# Patient Record
Sex: FEMALE | Race: WHITE | Employment: UNEMPLOYED | URBAN - METROPOLITAN AREA
[De-identification: names, ages, dates, MRNs, and addresses within clinical notes are randomized per-mention and may not be internally consistent; named-entity substitution may affect disease eponyms.]

---

## 2021-11-04 ENCOUNTER — HOSPITAL ENCOUNTER (OUTPATIENT)
Dept: PHYSICAL THERAPY | Age: 76
Discharge: HOME | End: 2021-11-04
Payer: COMMERCIAL

## 2021-11-04 PROCEDURE — 97162 PT EVAL MOD COMPLEX 30 MIN: CPT

## 2021-11-04 PROCEDURE — 97535 SELF CARE MNGMENT TRAINING: CPT

## 2021-11-04 PROCEDURE — 97112 NEUROMUSCULAR REEDUCATION: CPT

## 2021-11-04 NOTE — PROGRESS NOTES
Physical Therapy Evaluation        Patient Name: Rip Dobson  Date:2021  : 1945  [x]  Patient  Verified  Payor: BLUE CROSS / Plan: [facility] / Product Type: PPO /    In time:1150  Out time:1230  Total Treatment Time (min): 40  Visit #: 1 of 10    Medicare/BCBS Only   Total Timed Codes (min):  25 1:1 Treatment Time:  40       Treatment Area: Other symptoms and signs involving the genitourinary system [R39.89]    SUBJECTIVE  Pain Level (0-10 scale): 0  []constant []intermittent []improving []worsening []no change since onset    Any medication changes, allergies to medications, adverse drug reactions, diagnosis change, or new procedure performed?: [x] No    [] Yes (see summary sheet for update)  Subjective functional status/changes:     PLOF: no functional limitation, no issues with ADLs, active lifestyle  Limitations to PLOF: Decreased strength, coordination and endurance, poor behavioral and dietary habits for bowel health  Mechanism of Injury: insidious onset about a year ago  Current symptoms/Complaints: Patient c/o vaginal pressure and prolapse complaints. Patient states she thought she had some bleeding hemorrhoids at some point last year; states she had them removed but \"something was still there hanging out\" and states she took a picture and was told \"that's not from the rectum, its from the vagina\" which is when she was referred to Dr Noelle Mehta. Pt states Dr Noelle Mehta referred to PF PT to see if it can be managed conservatively; pt reports she would like to avoid having any type of surgery unless it is life threatening. Pt denies any pain with prolapse, just \"a little oozing\" from time to time and does report occasional \"fishy odor\".    Previous Treatment/Compliance: none, hemorrhoidectomy  PMHx/Surgical Hx: 4 pregnancies/3 vaginal deliveries; MEHREEN , RA, HBP, stroke, inguinal hernia repair; appendectomy  Work Hx: retired  Living Situation: alone  Pt Goals: \"to strengthen my muscles and avoid having surgery\"  Barriers: []pain []financial []time []transportation []other  Motivation: high  Substance use: [x]Alcohol []Tobacco []other:   Cognition: A & O x 4    Other:    Current urinary complaint  None    Daytime urinary frequency:  Every 3-4 hour(s) during the day    Nocturia:  2-3x/ night    Patient has failed previous pelvic floor muscle training? [] Yes    [x] No    Bowel function:  Constipation,  hard stool with straining  Stool Type ([place]): 1-2  Toileting position/modifications: standard toileting posture; has had to press into perineum to help evacuate, no splinting    Fecal Incontinence present? Weekly, stool gets pocketed in rectum and sometimes just finds it in the shower or the pad    Pain complaint:  none; RA related pain    Pain scale:  N/A    Pain description:  NA    Diet: \"I eat a lot of chocolate\" eating 3 meals/day with snacks, a lot of fruit, not into veggies    Fluid intake:    Fluid  Amount per day   Water \"I don't care for water so I will add something to it\" maybe 32-48 oz ice with some flavor (diluting all cokes/drinks with water)   Caffeine Decaf coffee and sodas   Alcohol rarely             Physical Exam Objective Findings:    Pelvic Floor Assessment  Patient was educated on pelvic floor anatomy, structure, and function and implications for current presentation of s/s. Patient consents to pelvic floor assessment.         Postural assessment:  neutral sitting and standing posture- good alignment    Range of Motion:  Patient with significant low back ROM restriction and accommodates with postural and gait changes     External trigger point/ muscle tenderness:    Superficial PFM tenderness/restriction no tenderness/tightness noted      PFM Screen:    Skin Integrity:  [] Healthy [x] Red  [] Labia Atrophy [] Fragile    Sensation: [x] Intact [] Diminished:    Muscle Bulk: [x] Symmetrical  [] Well-developed [x] Atrophied:  []L   []R   []B    Prolapse: [] Cystocele:   [x] Rectocele:visible at introitus upon visual inspection; did not specifically assess severity or level of prolapse today  [] Uterine prolapse:     Ability to actively bulge: min  Bulge with cough mod; bulge mod with knack prior to cough    Pelvic floor manual exam: Performed via internal vaginal assessment  female-upon vaginal palpation of the pelvic floor, the following was noted: mild hypotonicity, mild decreased sensation  Introitus restriction/TTP (reported as hands on a clock): none    Deep PFM Tenderness/Restriction: no tenderness    Pelvic floor MMT    PERF (Performance/Endurance/Repetitions//Flicks): 8/3/7//MARTIN  Breath holding and glut substitution noted; pt was unable to perform isolated PF contraction despite VC/TC and visual aid    Pelvic muscle release pattern  1 - poor release, no sensation of release      25 min [x]Eval                  []Re-Eval       15 min Self Care: Review and handout provided on the following: PFM anatomy, structure and function as it pertains to current s/s, complaints and condition. Reviewed expectations for PFPT and POC. Provided PF HEP *for improved awareness* 2-5 second holds x 10, 3 times/day supine or sitting  Permission to stop flow of urine 2x prior to next session for mm identification, not to use as exercise  GOAL to improve isolation and awareness before able to build up strength and endurance   Rationale:  Increase awareness and understanding of current condition to improve patients ability to independently and effectively attain goals and progress towards long term management of current condition.      10 min Neuromuscular Re-education:  []  See flow sheet :TC/VC and visual aid to initiate PF contraction and proper isolation; able to stop glut squeeze and with internal TC at mm belly, able to acitvate PF however still with breath holding   Rationale: increase strength, improve coordination and increase proprioception  to improve the patients ability to progress towards PF strength goals     With   [] TE   [] TA   [x] neuro   [x] other: Patient Education: [x] Review HEP    [] Progressed/Changed HEP based on:   [] positioning   [] body mechanics   [] transfers   [] heat/ice application    [] other:           Pain Level (0-10 scale) post treatment: 0    ASSESSMENT/Changes in Function: Patient is a 64year old female presenting to Physical Therapy with c/o pelvic organ prolapse complaints and fecal incontinence is limiting ability function at previous level. Patient presents with decreased strength, coordination, and endurance of the pelvic floor muscles and decreased strength of postural stabilizers. Patient presents with limited understanding of bowel anatomy/function and beneficial toileting techniques. I feel patient would benefit from skilled therapeutic intervention to optimize highest functional level possible. Patient will continue to benefit from skilled PT services to modify and progress therapeutic interventions, address functional mobility deficits, address ROM deficits, address strength deficits, analyze and address soft tissue restrictions, analyze and cue movement patterns, analyze and modify body mechanics/ergonomics and assess and modify postural abnormalities to attain remaining goals. [x]  See Plan of Care  []  See progress note/recertification  []  See Discharge Summary         Progress towards goals / Updated goals:  Short term goals: To be achieved in 4 weeks:  Patient will demonstrate proper fluid and fiber intake to aid in bowel movement consistency and frequency  Satus at eval: consuming 32-48 oz water and type 1-2 stool with straining, standard toileting posture and occasionally pressing perineum for evacuation    Patient will be able to maintain pelvic floor contraction for 5 seconds, 5 repetitions with no accessory substitution or breath holding.   Status at eval: PFMC 1 seconds, 3 repetitions with glut substitutions and breath holding    Long term goals: To be achieved in 10 weeks:  Patient will demonstrate improvement in fecal incontinence episodes evidenced by a 75% decrease in fecal smearing per month  Status at eval: Pt reports FI episodes \"weekly\" finding small amount in underwear, on floor or in shower    Patient will demonstrate pelvic floor muscle contraction 2/5 and ability to maintain contraction for 5 seconds, 10 repetitions. Status at eval: PFM 1/5, 1-2 second hold, 3 repetitions, with glut and breath holding substitution    Patient will demonstrate independence with management tools and exercise program that are beneficial for current condition in order to feel comfortable with Pelvic floor PT D/C and not fear exacerbation of current condition or symptoms returning.    Status at eval : patient fearful of return to exercise and unaware of what activities to avoid to avoid exacerbation of current condition    PLAN  []  Upgrade activities as tolerated     [x]  Continue plan of care  []  Update interventions per flow sheet       []  Discharge due to:_  []  Other:_  GOAL: PF STRENGTH AND AWARENESS (also bowel consistency/posture, avoid straining); plan: PF overflow therex and hips elevated Prolapse HEP for PM; core progressions,  stool consistency and toileting posture and straining review ; reassess PF prior to biofeedback: biofeedback;;  functional lifting/carrying    Benjamín Orantes, PT 11/4/2021  11:52 AM

## 2021-11-04 NOTE — PROGRESS NOTES
[facility] at [address] [place], [address] [facility] [place]  Ph [phone]  Fx [phone]    Plan of Care/ Statement of Necessity for Physical Therapy Services    Patient name: Daisy Melendez Start of Care: 2021   Referral source: Satya Luna MD : 1945    Medical Diagnosis: Other symptoms and signs involving the genitourinary system [R39.89]   Onset Date: 2+ years ago    Treatment Diagnosis: Other symptoms and signs involving the genitourinary system [R39.89]   Prior Hospitalization: see medical history Provider#: [id number]   Medications: Verified on Patient summary List    Comorbidities: PMHx/Surgical Hx: 4 pregnancies/3 vaginal deliveries; MEHREEN , RA, HBP, stroke, inguinal hernia repair; appendectomy   Prior Level of Function: PLOF: no functional limitation, no issues with ADLs, active lifestyle          The Plan of Care and following information is based on the information from the initial evaluation. Assessment/ key information:  Patient is a 64year old female presenting to Physical Therapy with c/o pelvic organ prolapse complaints and fecal incontinence is limiting ability function at previous level. Patient presents with decreased strength, coordination, and endurance of the pelvic floor muscles and decreased strength of postural stabilizers. Patient presents with limited understanding of bowel anatomy/function and beneficial toileting techniques. I feel patient would benefit from skilled therapeutic intervention to optimize highest functional level possible.      Patient will continue to benefit from skilled PT services to modify and progress therapeutic interventions, address functional mobility deficits, address ROM deficits, address strength deficits, analyze and address soft tissue restrictions, analyze and cue movement patterns, analyze and modify body mechanics/ergonomics and assess and modify postural abnormalities to attain remaining goals.     Evaluation Complexity History MEDIUM  Complexity : 1-2 comorbidities / personal factors will impact the outcome/ POC ; Examination MEDIUM Complexity : 3 Standardized tests and measures addressing body structure, function, activity limitation and / or participation in recreation  ;Presentation MEDIUM Complexity : Evolving with changing characteristics  ; Clinical Decision Making MEDIUM   Overall Complexity Rating: MEDIUM    Problem List: Pelvic pain/dysfunction, Decreased pelvic floor mm awareness, Decreased pelvic floor mm strength, Use of accessory muscles and Improper voiding habits  Treatment Plan may include any combination of the following: Therapeutic exercise, Urge suppression techniques, Neuromuscular re-education, Manual therapy, Physical agent/modality and Patient education  Patient / Family readiness to learn indicated by: asking questions, trying to perform skills and interest  Persons(s) to be included in education: patient (P)  Barriers to Learning/Limitations: None  Measures taken if barriers to learning: NA  Patient Goal (s): to strengthen my muscles and avoid having surgery  Patient Self Reported Health Status: good  Rehabilitation Potential: good    Short term goals: To be achieved in 4 weeks:  Patient will demonstrate proper fluid and fiber intake to aid in bowel movement consistency and frequency  Satus at eval: consuming 32-48 oz water and type 1-2 stool with straining, standard toileting posture and occasionally pressing perineum for evacuation     Patient will be able to maintain pelvic floor contraction for 5 seconds, 5 repetitions with no accessory substitution or breath holding. Status at eval: PFMC 1 seconds, 3 repetitions with glut substitutions and breath holding     Long term goals:  To be achieved in 10 weeks:  Patient will demonstrate improvement in fecal incontinence episodes evidenced by a 75% decrease in fecal smearing per month  Status at eval: Pt reports FI episodes \"weekly\" finding small amount in underwear, on floor or in shower     Patient will demonstrate pelvic floor muscle contraction 2/5 and ability to maintain contraction for 5 seconds, 10 repetitions. Status at eval: PFM 1/5, 1-2 second hold, 3 repetitions, with glut and breath holding substitution     Patient will demonstrate independence with management tools and exercise program that are beneficial for current condition in order to feel comfortable with Pelvic floor PT D/C and not fear exacerbation of current condition or symptoms returning. Status at eval : patient fearful of return to exercise and unaware of what activities to avoid to avoid exacerbation of current condition    Frequency / Duration: Patient to be seen 1 times per week for 10 weeks. Patient/ Caregiver education and instruction: Diagnosis, prognosis, Proper Voiding Habits, Diet and Exercises   [x]  Plan of care has been reviewed with PTA    Certification Period: FLY Watson, PT 11/4/2021 2:42 PM    ________________________________________________________________________    I certify that the above Therapy Services are being furnished while the patient is under my care. I agree with the treatment plan and certify that this therapy is necessary.     Physician's Signature:____________________  Date:____________Time:____________                                      Joao Hernandez MD      Please sign and return to [facility]  [address] [place], [address] [facility]  Ph [phone]  Fx [phone]

## 2021-11-09 ENCOUNTER — HOSPITAL ENCOUNTER (OUTPATIENT)
Dept: PHYSICAL THERAPY | Age: 76
Discharge: HOME | End: 2021-11-09
Payer: COMMERCIAL

## 2021-11-09 PROCEDURE — 97535 SELF CARE MNGMENT TRAINING: CPT

## 2021-11-09 PROCEDURE — 97110 THERAPEUTIC EXERCISES: CPT

## 2021-11-09 NOTE — PROGRESS NOTES
PT DAILY TREATMENT NOTE    Patient Name: Valery Frazier  Date:2021  : 1945  [x]  Patient  Verified  Payor: BLUE CROSS / Plan: [employer] [facility] [place] / Product Type: PPO /    In time: 1233  Out time:118  Total Treatment Time (min): 45  Total Timed Codes (min): 45  1:1 Treatment Time ( [address] only): 45   Visit #: 2 of 10    Treatment Area: Other symptoms and signs involving the genitourinary system [R39.89]    SUBJECTIVE  Pain Level (0-10 scale): 0/10  Any medication changes, allergies to medications, adverse drug reactions, diagnosis change, or new procedure performed?: [x] No    [] Yes (see summary sheet for update)  Subjective functional status/changes:   [] No changes reported  Patient reports: good  compliance with current HEP. OBJECTIVE    37 min Therapeutic Exercise:  [x] See flow sheet :   Rationale: Increase core strength, Inhibit abnormal muscle activity and Improve lumbosacral and coccygeal mobility in order to Improve ability to perform ADLs. 8 min Self Care: Thorough review and handout provided on the following: HEP for core stability   Rationale:  Increase awareness and understanding of current condition to improve patients ability to independently and effectively attain goals and progress towards long term management of current condition. With   [] TE   [] TA   [] neuro   [] other: Patient Education: [x] Review HEP    [] Progressed/Changed HEP based on:   [] positioning   [] body mechanics   [] transfers   [] heat/ice application    [] other:        Pain Level (0-10 scale) post treatment: 0/10    ASSESSMENT/Changes in Function: Patient tolerated today's session well with no c/o pain. Patient demonstrated understanding of today's instruction, education, and recommendations. Patient is progressing appropriately towards goals.       Patient will continue to benefit from skilled PT services to modify and progress therapeutic interventions, address functional mobility deficits, address ROM deficits, address strength deficits, analyze and address soft tissue restrictions, analyze and cue movement patterns, analyze and modify body mechanics/ergonomics, assess and modify postural abnormalities and instruct in home and community integration to attain remaining goals. [x]  See Plan of Care  []  See progress note/recertification  []  See Discharge Summary         Progress towards goals / Updated goals:  Short term goals: To be achieved in 4 weeks:  Patient will demonstrate proper fluid and fiber intake to aid in bowel movement consistency and frequency  Satus at eval: consuming 32-48 oz water and type 1-2 stool with straining, standard toileting posture and occasionally pressing perineum for evacuation  Current: Patient reports reduced straining and her bowel movements are now 5 on the [facility] scale. (11/9/21)  Progressing      Patient will be able to maintain pelvic floor contraction for 5 seconds, 5 repetitions with no accessory substitution or breath holding. Status at eval: PFMC 1 seconds, 3 repetitions with glut substitutions and breath holding     Long term goals: To be achieved in 10 weeks:  Patient will demonstrate improvement in fecal incontinence episodes evidenced by a 75% decrease in fecal smearing per month  Status at eval: Pt reports FI episodes \"weekly\" finding small amount in underwear, on floor or in shower     Patient will demonstrate pelvic floor muscle contraction 2/5 and ability to maintain contraction for 5 seconds, 10 repetitions. Status at eval: PFM 1/5, 1-2 second hold, 3 repetitions, with glut and breath holding substitution     Patient will demonstrate independence with management tools and exercise program that are beneficial for current condition in order to feel comfortable with Pelvic floor PT D/C and not fear exacerbation of current condition or symptoms returning.    Status at eval : patient fearful of return to exercise and unaware of what activities to avoid to avoid exacerbation of current condition       PLAN  []  Upgrade activities as tolerated     [x]  Continue plan of care  [x]  Update interventions per flow sheet       []  Discharge due to:_  []  Other:_   PF STRENGTH AND AWARENESS (also bowel consistency/posture, avoid straining); plan: PF overflow therex and hips elevated Prolapse HEP for PM; core progressions,  stool consistency and toileting posture and straining review ; reassess PF prior to biofeedback: biofeedback;;  functional lifting/carrying    Yasmeen Meade, PT 11/9/2021  12:18 PM    Future Appointments   Date Time Provider Jean-Pierre Schulz   11/9/2021 12:30 PM Macario Argueta [facility]   11/16/2021 12:30 PM Macario Argueta [facility]   11[facility]23[facility]2021  [facility]   11/30/2021  2:00 PM Macario Argueta [facility]   12/7/2021 10:15 AM Macario [facility]   12/14/2021 10:15 AM Kinza Whitfield [facility]   12/21/2021 10:15 AM Macario Argueta [facility]   12/28/2021 10:15 AM Macario [facility]

## 2021-11-16 ENCOUNTER — HOSPITAL ENCOUNTER (OUTPATIENT)
Dept: PHYSICAL THERAPY | Age: 76
Discharge: HOME | End: 2021-11-16
Payer: COMMERCIAL

## 2021-11-16 PROCEDURE — 97110 THERAPEUTIC EXERCISES: CPT

## 2021-11-16 PROCEDURE — 97530 THERAPEUTIC ACTIVITIES: CPT

## 2021-11-16 PROCEDURE — 97535 SELF CARE MNGMENT TRAINING: CPT

## 2021-11-16 NOTE — PROGRESS NOTES
PT DAILY TREATMENT NOTE    Patient Name: Wesly Mazariegos  Date:2021  : 1945  [x]  Patient  Verified  Payor: BLUE CROSS / Plan: Kyra Guidry / Product Type: PPO /    In time:1230  Out time:145  Total Treatment Time (min): 45  Total Timed Codes (min): 45  1:1 Treatment Time ( [address] only): 45   Visit #: 3 of 10    Treatment Area: Other symptoms and signs involving the genitourinary system [R39.89]    SUBJECTIVE  Pain Level (0-10 scale): 0/10  Any medication changes, allergies to medications, adverse drug reactions, diagnosis change, or new procedure performed?: [x] No    [] Yes (see summary sheet for update)  Subjective functional status/changes:   [] No changes reported  Patient reports: good compliance with current HEP. Patient reports doing the exercises on the bed but the bed is too soft. She prefers doing the exercises on the floor but has difficulty transferring from floor to standing due to arthritis (RA) in the knees. OBJECTIVE    22 min Therapeutic Exercise:  [x] See flow sheet :   Rationale: Increase pelvic floor muscle strength, Improve quality of pelvic floor contractions, Increase core strength, Inhibit abnormal muscle activity and Improve lumbosacral and coccygeal mobility in order to Increase fecal continence and Improve ability to perform ADLs. 8 min Self Care: Thorough review and handout provided on the following:    Rationale:  Increase awareness and understanding of current condition to improve patients ability to independently and effectively attain goals and progress towards long term management of current condition. 15 min Therapeutic Activity:  [x]  See flow sheet :   Rationale: Increase pelvic floor muscle strength and Increase core strength in order to Improve ability to perform ADLs.               With   [] TE   [] TA   [] neuro   [] other: Patient Education: [x] Review HEP    [] Progressed/Changed HEP based on:   [] positioning   [] body mechanics   [] transfers   [] heat/ice application    [] other:         Pain Level (0-10 scale) post treatment: 0/10    ASSESSMENT/Changes in Function: Patient tolerated today's session well with no c/o pain. Patient demonstrated understanding of today's instruction, education, and recommendations. Patient is progressing appropriately towards goals. Patient will continue to benefit from skilled PT services to modify and progress therapeutic interventions, address functional mobility deficits, address ROM deficits, address strength deficits, analyze and address soft tissue restrictions, analyze and cue movement patterns, analyze and modify body mechanics/ergonomics and instruct in home and community integration to attain remaining goals. [x]  See Plan of Care  []  See progress note/recertification  []  See Discharge Summary         Progress towards goals / Updated goals:  Short term goals: To be achieved in 4 weeks:  Patient will demonstrate proper fluid and fiber intake to aid in bowel movement consistency and frequency  Satus at [facility]: consuming 32-48 oz water and type 1-2 stool with straining, standard toileting posture and occasionally pressing perineum for evacuation  Current: Patient reports reduced straining and her bowel movements are now 5 on the [facility] scale. (11/9/21)  Progressing   Current:  Patient reports reduced straining by her bowel movements are 1-2 on the [place] scale (11/16/21) Progressing  But stool quality has regressed     Patient will be able to maintain pelvic floor contraction for 5 seconds, 5 repetitions with no accessory substitution or breath holding. Status at [facility]: PFMC 1 seconds, 3 repetitions with glut substitutions and breath holding     Long term goals:  To be achieved in 10 weeks:  Patient will demonstrate improvement in fecal incontinence episodes evidenced by a 75% decrease in fecal smearing per month  Status at eval: Pt reports FI episodes \"weekly\" finding small amount in underwear, on floor or in shower  Current:  Pt reports no bowel movement on the floor of the shower. (11/16/21)     Patient will demonstrate pelvic floor muscle contraction 2/5 and ability to maintain contraction for 5 seconds, 10 repetitions. Status at eval: PFM 1/5, 1-2 second hold, 3 repetitions, with glut and breath holding substitution     Patient will demonstrate independence with management tools and exercise program that are beneficial for current condition in order to feel comfortable with Pelvic floor PT D/C and not fear exacerbation of current condition or symptoms returning.    Status at eval : patient fearful of return to exercise and unaware of what activities to avoid to avoid exacerbation of current condition       PLAN  []  Upgrade activities as tolerated     [x]  Continue plan of care  [x]  Update interventions per flow sheet       []  Discharge due to:_  []  Other:_   PF STRENGTH AND AWARENESS (also bowel consistency/posture, avoid straining); plan: PF overflow therex and hips elevated Prolapse HEP for PM; core progressions,  stool consistency and toileting posture and straining review ; reassess PF prior to biofeedback: biofeedback;;  functional lifting/carrying    Alonzo Strauss PT 11/16/2021  8:25 AM    Future Appointments   Date Time Provider Jean-Pierre Schulz   11/16/2021 12:30 PM Deangelo Mahesh, PT [facility]   11/23/2021  1:15 PM Sheyla Zapata PT [facility]   11/29/2021 12:30 PM Deangelo Aragon, PT [facility]   12/7/2021 10:15 AM Deangelo Mahesh, PT [facility]   12/14/2021 10:15 AM Sheyla Zapata PT [facility]   12/21/2021 10:15 AM Deangeloantwan Aragon, PT [facility]   12/28/2021 10:15 AM Deangelo Mahesh, PT [facility]

## 2021-11-23 ENCOUNTER — APPOINTMENT (OUTPATIENT)
Dept: PHYSICAL THERAPY | Age: 76
End: 2021-11-23
Payer: COMMERCIAL

## 2021-11-29 ENCOUNTER — HOSPITAL ENCOUNTER (OUTPATIENT)
Dept: PHYSICAL THERAPY | Age: 76
Discharge: HOME | End: 2021-11-29
Payer: COMMERCIAL

## 2021-11-29 PROCEDURE — 97530 THERAPEUTIC ACTIVITIES: CPT

## 2021-11-29 PROCEDURE — 97535 SELF CARE MNGMENT TRAINING: CPT

## 2021-11-29 PROCEDURE — 97110 THERAPEUTIC EXERCISES: CPT

## 2021-11-29 NOTE — PROGRESS NOTES
PT DAILY TREATMENT NOTE    Patient Name: Sheyla Corado  Date:2021  : 1945  [x]  Patient  Verified  Payor: BLUE CROSS / Plan: [employer] [facility] / Product Type: PPO /    In time:1239  Out time: 132  Total Treatment Time (min): 53  Total Timed Codes (min): 53  1:1 Treatment Time ( [address] only): 53   Visit #: 4 of 10    Treatment Area: Other symptoms and signs involving the genitourinary system [R39.89]    SUBJECTIVE  Pain Level (0-10 scale): 0/10  Any medication changes, allergies to medications, adverse drug reactions, diagnosis change, or new procedure performed?: [x] No    [] Yes (see summary sheet for update)  Subjective functional status/changes:   [] No changes reported  Patient reports: good  compliance with current HEP. Patent reported that she will have a Basal cell carcinoma removed 21. OBJECTIVE    25 min Therapeutic Exercise:  [x] See flow sheet :   Rationale: Increase pelvic floor muscle strength, Improve quality of pelvic floor contractions, Increase core strength, Inhibit abnormal muscle activity and Improve lumbosacral and coccygeal mobility in order to Increase fecal continence and Improve ability to perform ADLs. 8 min Self Care: Reviewed HEP and added sidelying exercises (for post surgery)   Rationale:  Increase awareness and understanding of current condition to improve patients ability to independently and effectively attain goals and progress towards long term management of current condition. 20 min Therapeutic Activity:  [x]  See flow sheet :  Reassessed PFMT and reviewed all goals   Rationale: Increase pelvic floor muscle strength, Improve quality of pelvic floor contractions, Increase core strength, Inhibit abnormal muscle activity and Improve lumbosacral and coccygeal mobility in order to Increase fecal continence and Improve ability to perform ADLs.                  With   [] TE   [] TA   [] neuro   [] other: Patient Education: [x] Review HEP    [] Progressed/Changed HEP based on:   [] positioning   [] body mechanics   [] transfers   [] heat/ice application    [] other:      Other Objective/Functional Measures:  Pt's PF strength = 2/4/4    Pain Level (0-10 scale) post treatment: 0/10    ASSESSMENT/Changes in Function: Patient tolerated today's session well with no c/o pain. Patient demonstrated understanding of today's instruction, education, and recommendations. Patient is progressing appropriately towards goals. Patient's pelvic floor strength was assessed (quicks were not assessed). Patient will continue to benefit from skilled PT services to modify and progress therapeutic interventions, address functional mobility deficits, address ROM deficits, address strength deficits, analyze and address soft tissue restrictions, analyze and cue movement patterns, analyze and modify body mechanics/ergonomics, assess and modify postural abnormalities and instruct in home and community integration to attain remaining goals. [x]  See Plan of Care  []  See progress note/recertification  []  See Discharge Summary         Progress towards goals / Updated goals:  Short term goals:  To be achieved in 4 weeks:  Patient will demonstrate proper fluid and fiber intake to aid in bowel movement consistency and frequency  Satus at eval: consuming 32-48 oz water and type 1-2 stool with straining, standard toileting posture and occasionally pressing perineum for evacuation  Current: Patient reports reduced straining and her bowel movements are now 5 on the [place] scale.  (11/9/21)  Progressing   Current:  Patient reports reduced straining by her bowel movements are 1-2 on the [place] scale (11/16/21) Progressing  But stool quality has regressed  Current :  11/29/21  Patient reports reduced straining by her bowel movements are 1-2 on the [place] scale  Progressing      Patient will be able to maintain pelvic floor contraction for 5 seconds, 5 repetitions with no accessory substitution or breath holding. Status at eval: PF 1 seconds, 3 repetitions with glut substitutions and breath holding  Current:  [facility] 2/4/4 with glut substitution       Long term goals: To be achieved in 10 weeks:  Patient will demonstrate improvement in fecal incontinence episodes evidenced by a 75% decrease in fecal smearing per month  Status at eval: Pt reports FI episodes \"weekly\" finding small amount in underwear, on floor or in shower  Current:  Pt reports no bowel movement on the floor of the shower. (11/16/21)  Current:  Pt reports occasional FI but less often and she reports feeling that there is fecal material.  11/29/21 Progressing     Patient will demonstrate pelvic floor muscle contraction 2/5 and ability to maintain contraction for 5 seconds, 10 repetitions. Status at eval: PFM 1/5, 1-2 second hold, 3 repetitions, with glut and breath holding substitution   Current:  11/29/21 PF 2/4/4 with glut substitution  Patient will demonstrate independence with management tools and exercise program that are beneficial for current condition in order to feel comfortable with Pelvic floor PT D/C and not fear exacerbation of current condition or symptoms returning.    Status at eval : patient fearful of return to exercise and unaware of what activities to avoid to avoid exacerbation of current condition  Current:  11/29/21 Patient is progressing using management tools and exercise.       PLAN  []  Upgrade activities as tolerated     [x]  Continue plan of care  [x]  Update interventions per flow sheet       []  Discharge due to:_  []  Other:_   GOAL: PF STRENGTH AND AWARENESS (also bowel consistency/posture, avoid straining); plan: PF overflow therex and hips elevated Prolapse HEP for PM; core progressions,  stool consistency and toileting posture and straining review ; reassess PF prior to biofeedback: biofeedback;;  functional lifting/carrying    Aguila Nance, PT 11/29/2021  11:27 AM    Future Appointments Date Time Provider Jean-Pierre Jazz   11/29/2021 12:30 PM Vertie Colon, [facility]   12/7/2021 10:15 AM Christiano Escamilla, [facility]   12/14/2021 10:15 AM Migdalia Little, [facility]   12/21/2021 10:15 AM Verlyndon Harborcreek, [facility]   12/28/2021 10:15 AM [facility], [facility]

## 2021-11-29 NOTE — PROGRESS NOTES
[facility] at [address] [facility]  [facility], [address]  Ph [phone]  Fx [phone]    Physical Therapy Progress Note  Patient name: Sebastián Israel Start of Care: 21   Referral source: Debo Elder MD : 1945   Medical/Treatment Diagnosis: Other symptoms and signs involving the genitourinary system [R39.89] Onset Date: 2+ years ago   Prior Hospitalization: see medical history Provider#: [id number]   Medications: Verified on Patient Summary List    Comorbidities: PMHx/Surgical Hx: 4 pregnancies/3 vaginal deliveries; MEHREEN , RA, HBP, stroke, inguinal hernia repair; appendectomy   Prior Level of Function: PLOF: no functional limitation, no issues with ADLs, active lifestyle                   Visits from Start of Care: 4    Missed Visits: 0    Updated Goals/Measure of Progress: To be achieved in 6 weeks:    Short term goals: To be achieved in 3 weeks:  Patient will demonstrate proper fluid and fiber intake to aid in bowel movement consistency and frequency  Satus at eval: consuming 32-48 oz water and type 1-2 stool with straining, standard toileting posture and occasionally pressing perineum for evacuation  Current: Patient reports reduced straining and her bowel movements are now 5 on the [place] scale.  (21)  Progressing   Current:  Patient reports reduced straining by her bowel movements are 1-2 on the [place] scale (21) Progressing  But stool quality has regressed  Current :  21  Patient reports reduced straining by her bowel movements are 1-2 on the [place] scale  Progressing      Patient will be able to maintain pelvic floor contraction for 5 seconds, 5 repetitions with no accessory substitution or breath holding. Status at eval: PFMC 1 seconds, 3 repetitions with glut substitutions and breath holding  Current:  [facility]  with glut substitution       Long term goals:  To be achieved in 6 weeks:  Patient will demonstrate improvement in fecal incontinence episodes evidenced by a 75% decrease in fecal smearing per month  Status at eval: Pt reports FI episodes \"weekly\" finding small amount in underwear, on floor or in shower  Current:  Pt reports no bowel movement on the floor of the shower. (11/16/21)  Current:  Pt reports occasional FI but less often and she reports feeling that there is fecal material.  11/29/21 Progressing     Patient will demonstrate pelvic floor muscle contraction 2/5 and ability to maintain contraction for 5 seconds, 10 repetitions. Status at eval: PFM 1/5, 1-2 second hold, 3 repetitions, with glut and breath holding substitution   Current:  11/29/21 PF 2/4/4 with glut substitution  Patient will demonstrate independence with management tools and exercise program that are beneficial for current condition in order to feel comfortable with Pelvic floor PT D/C and not fear exacerbation of current condition or symptoms returning. Status at eval : patient fearful of return to exercise and unaware of what activities to avoid to avoid exacerbation of current condition  Current:  11/29/21 Patient is progressing using management tools and exercise.         Summary of Care/ Key Functional Changes: Patient is a 65 y/o female with pelvic organ prolapse and fecal incontinence. She has attended 4 pelvic floor physical therapy sessions and has increased her strength of contraction from 1/5 with a 1-2 second hold and 3 repetitions to a 2/5 with a 4 second hold and 4 repetitions. She also reports decrease in FI episodes and increased perianal awareness. She is progressing towards all goals and will benefit from continued pelvic floor physical therapy.     ASSESSMENT/RECOMMENDATIONS:  [x]Continue therapy per initial plan/protocol at a frequency of  1  x per week for 6 weeks  []Continue therapy with the following recommended changes:_____________________ _____________________________ ________________________________________  []Discontinue therapy progressing towards or have reached established goals  []Discontinue therapy due to lack of appreciable progress towards goals  []Discontinue therapy due to lack of attendance or compliance  []Await Physician's recommendations/decisions regarding therapy  []Other:________________________________________________________________    Thank you for this referral.   Mary Tinajero, PT 11/29/2021 1:39 PM

## 2021-11-30 ENCOUNTER — APPOINTMENT (OUTPATIENT)
Dept: PHYSICAL THERAPY | Age: 76
End: 2021-11-30
Payer: COMMERCIAL

## 2021-12-07 ENCOUNTER — APPOINTMENT (OUTPATIENT)
Dept: PHYSICAL THERAPY | Age: 76
End: 2021-12-07
Payer: COMMERCIAL

## 2021-12-09 ENCOUNTER — HOSPITAL ENCOUNTER (OUTPATIENT)
Dept: PHYSICAL THERAPY | Age: 76
Discharge: HOME | End: 2021-12-09
Payer: COMMERCIAL

## 2021-12-09 PROCEDURE — 97110 THERAPEUTIC EXERCISES: CPT

## 2021-12-09 PROCEDURE — 97530 THERAPEUTIC ACTIVITIES: CPT

## 2021-12-09 NOTE — PROGRESS NOTES
PT DAILY TREATMENT NOTE    Patient Name: Gaudencio Shirley  Date:2021  : 1945  [x]  Patient  Verified  Payor: BLUE CROSS / Plan: [facility] / Product Type: PPO /    In time: 1015  Out time:1100  Total Treatment Time (min): 45  Total Timed Codes (min): 45  1:1 Treatment Time ([facility] only): 45   Visit #: 5 of 10    Treatment Area: Other symptoms and signs involving the genitourinary system [R39.89]    SUBJECTIVE  Pain Level (0-10 scale): 0/10  Any medication changes, allergies to medications, adverse drug reactions, diagnosis change, or new procedure performed?: [x] No    [] Yes (see summary sheet for update)  Subjective functional status/changes:   [] No changes reported  Patient reports: decreased compliance with current HEP due  Having a surgery on sacral coccyx region for malignant skin cancer. She was able to do the Kegel exercises. Patient reports that she feels that her rectocele is larger. OBJECTIVE    30 min Therapeutic Exercise:  [x] See flow sheet :   Rationale: Increase pelvic floor muscle strength, Improve quality of pelvic floor contractions, Increase core strength, Inhibit abnormal muscle activity and Improve lumbosacral and coccygeal mobility in order to Increase fecal continence and Improve ability to perform ADLs. 15 min Therapeutic Activity:  [x]  See flow sheet :   Rationale: Increase pelvic floor muscle strength, Improve quality of pelvic floor contractions, Increase core strength, Inhibit abnormal muscle activity and Improve lumbosacral and coccygeal mobility in order to improve fecal incontinence and ADL completion.                  With   [] TE   [] TA   [] neuro   [] other: Patient Education: [x] Review HEP    [] Progressed/Changed HEP based on:   [] positioning   [] body mechanics   [] transfers   [] heat/ice application    [] other:           Pain Level (0-10 scale) post treatment: 0/10    ASSESSMENT/Changes in Function: Patient tolerated today's session well with no c/o pain. Patient demonstrated understanding of today's instruction, education, and recommendations. Patient is progressing appropriately towards goals. Patient reported no difficulty with addition of standing resisted upper extremity activities with transversus abdominis contraction and carries. Patient will continue to benefit from skilled PT services to modify and progress therapeutic interventions, address functional mobility deficits, address ROM deficits, address strength deficits, analyze and address soft tissue restrictions, analyze and cue movement patterns, analyze and modify body mechanics/ergonomics, assess and modify postural abnormalities and instruct in home and community integration to attain remaining goals. [x]  See Plan of Care  []  See progress note/recertification  []  See Discharge Summary         Progress towards goals / Updated goals:  Short term goals: To be achieved in 4 weeks:  Patient will demonstrate proper fluid and fiber intake to aid in bowel movement consistency and frequency  Satus at eval: consuming 32-48 oz water and type 1-2 stool with straining, standard toileting posture and occasionally pressing perineum for evacuation  Current: Patient reports reduced straining and her bowel movements are now 5 on the [place] scale.  (11/9/21)  Progressing   Current:  Patient reports reduced straining by her bowel movements are 1-2 on the [place] scale (11/16/21) Progressing  But stool quality has regressed  Current :  11/29/21  Patient reports reduced straining by her bowel movements are 1-2 on the [facility] scale  Progressing   Current:  12/9/21 Patient reports no straining and that her bowel movements are a 5 on the [facility] scale Progressing     Patient will be able to maintain pelvic floor contraction for 5 seconds, 5 repetitions with no accessory substitution or breath holding.   Status at eval: PFMC 1 seconds, 3 repetitions with glut substitutions and breath holding  Current:  [facility] 2/4/4 with glut substitution        Long term goals: To be achieved in 10 weeks:  Patient will demonstrate improvement in fecal incontinence episodes evidenced by a 75% decrease in fecal smearing per month  Status at eval: Pt reports FI episodes \"weekly\" finding small amount in underwear, on floor or in shower  Current:  Pt reports no bowel movement on the floor of the shower.  (11/16/21)  Current:  Pt reports occasional FI but less often and she reports feeling that there is fecal material.  11/29/21 Progressing  Current:  Pt reports  FI but not every day - depending upon what she eats. 12/9/21     Patient will demonstrate pelvic floor muscle contraction 2/5 and ability to maintain contraction for 5 seconds, 10 repetitions. Status at eval: PFM 1/5, 1-2 second hold, 3 repetitions, with glut and breath holding substitution   Current:  11/29/21 PF 2/4/4 with glut substitution  Patient will demonstrate independence with management tools and exercise program that are beneficial for current condition in order to feel comfortable with Pelvic floor PT D/C and not fear exacerbation of current condition or symptoms returning.    Status at eval : patient fearful of return to exercise and unaware of what activities to avoid to avoid exacerbation of current condition  Current:  11/29/21 Patient is progressing using management tools and exercise.       PLAN  []  Upgrade activities as tolerated     [x]  Continue plan of care  [x]  Update interventions per flow sheet       []  Discharge due to:_  []  Other:_  GOAL: PF STRENGTH AND AWARENESS (also bowel consistency/posture, avoid straining); plan: PF overflow therex and hips elevated Prolapse HEP for PM; core progressions,  stool consistency and toileting posture and straining review ; reassess PF prior to biofeedback: biofeedback;;  functional lifting/carrying    Mary Tinajero PT 12/9/2021  8:10 AM    Future Appointments   Date Time Provider Department [place]   12/9/2021 10:15 AM Randall Levy [facility]   12/14/2021 10:15 AM Ilan Bennett, PT [facility]   12/21/2021 10:15 AM Randall Levy, PT [facility]   12/28/2021 10:15 AM Randall Levy, PT [facility]

## 2021-12-14 ENCOUNTER — HOSPITAL ENCOUNTER (OUTPATIENT)
Dept: PHYSICAL THERAPY | Age: 76
Discharge: HOME | End: 2021-12-14
Payer: COMMERCIAL

## 2021-12-14 PROCEDURE — 97112 NEUROMUSCULAR REEDUCATION: CPT

## 2021-12-14 PROCEDURE — 97535 SELF CARE MNGMENT TRAINING: CPT

## 2021-12-14 NOTE — PROGRESS NOTES
PT DAILY TREATMENT NOTE    Patient Name: Jocelin March  Date:2021  : 1945  [x]  Patient  Verified  Payor: BLUE CROSS / Plan:  [facility] / Product Type: PPO /    In time:1015  Out time:1110  Total Treatment Time (min): 55  Total Timed Codes (min): 55  1:1 Treatment Time ( [address] only): 55   Visit #: 6 of 10    Treatment Area: Other symptoms and signs involving the genitourinary system [R39.89]    SUBJECTIVE  Pain Level (0-10 scale): 0/10  Any medication changes, allergies to medications, adverse drug reactions, diagnosis change, or new procedure performed?: [x] No    [] Yes (see summary sheet for update)  Subjective functional status/changes:   [] No changes reported  Patient reports: compliance with current HEP. Patient states she felt like she had to have BM \"all of the time\" last week and took a mirror and \"it seemed cr\" and also noted an increase in wetness in pantiliners and does not think that it is urine. OBJECTIVE    45 min Self Care:  Thorough review and handout provided on the following: bowel routine, fiber; extensive review of anatomy and prolapse as it relates to bowel consistency and \"fullness\"  Reviewed perineal press and splinting options for assisting with complete evacuation and rationale  Reviewed toileting posture for improved evacuation, provided h/o  Reviewed plan for soluble fiber increase with food options and water intake and asking MD to implement metamucil/psyllium PRN in future  Reviewed plan for PF PT with intention to improve stool consistency and evacuation while progressing strength and management tools  Reviewed goal to decrease pressure when present with hips elevated and kegel/hip isometrics, rationale provided  Set tonya for kegel 3x/day in session: 5/5/10, no quicks at this time due to poor coordination    Rationale:  Increase awareness and understanding of current condition to improve patients ability to independently and effectively attain goals and progress towards long term management of current condition. 10 min Neuromuscular Re-education:  [x]  See flow sheet : VC and visual cues for PF contraction in seated, pt reporting sensation of contraction with all cues  Unable to coordinate quicks with proper breathing; deferred until next session as appropriate   Rationale: Increase pelvic floor muscle strength, Improve quality of pelvic floor contractions and Inhibit abnormal muscle activity in order to Increase fecal continence and Improve frequency and ease of bowel movements. With   [] TE   [] TA   [x] neuro   [x] other: Patient Education: [x] Review HEP    [] Progressed/Changed HEP based on:   [] positioning   [] body mechanics   [] transfers   [] heat/ice application    [] other:        Pain Level (0-10 scale) post treatment: 0/10    ASSESSMENT/Changes in Function: Patient tolerated today's session well with no c/o pain. Patient demonstrated understanding of today's instruction, education, and recommendations. Patient is progressing appropriately towards goals. Patient will benefit from internal PF reassessment and biofeedback for HEP progression and improved PF strength/endurance and coordination. Pt will also benefit from efforts to maintain type 4 stool to avoid excessive stool burden at rectocele. Patient will continue to benefit from skilled PT services to modify and progress therapeutic interventions, address functional mobility deficits, address ROM deficits, address strength deficits, analyze and address soft tissue restrictions, analyze and cue movement patterns, analyze and modify body mechanics/ergonomics and assess and modify postural abnormalities to attain remaining goals. [x]  See Plan of Care  []  See progress note/recertification  []  See Discharge Summary         Progress towards goals / Updated goals:  Short term goals:  To be achieved in 4 weeks:  Patient will demonstrate proper fluid and fiber intake to aid in bowel movement consistency and frequency  Satus at eval: consuming 32-48 oz water and type 1-2 stool with straining, standard toileting posture and occasionally pressing perineum for evacuation  Current: Patient reports reduced straining and her bowel movements are now 5 on the [place] scale.  (11/9/21)  Progressing   Current:  Patient reports reduced straining by her bowel movements are 1-2 on the [place] scale (11/16/21) Progressing  But stool quality has regressed  Current :  11/29/21  Patient reports reduced straining by her bowel movements are 1-2 on the [employer]   Current:  12/9/21 Patient reports no straining and that her bowel movements are a 5 on the [facility] scale Progressing  12/14/2021 pt reports stool is type 2, hard but no straining; reviewed and implemented soluble fiber recommendations and toileting posture     Patient will be able to maintain pelvic floor contraction for 5 seconds, 5 repetitions with no accessory substitution or breath holding. Status at [facility]: [facility] 1 seconds, 3 repetitions with glut substitutions and breath holding  Current: [facility] 2/4/4 with glut substitution        Long term goals: To be achieved in 10 weeks:  Patient will demonstrate improvement in fecal incontinence episodes evidenced by a 75% decrease in fecal smearing per month  Status at [facility]: Pt reports FI episodes \"weekly\" finding small amount in underwear, on floor or in shower  Current:  Pt reports no bowel movement on the floor of the shower.  (11/16/21)  Current:  Pt reports occasional FI but less often and she reports feeling that there is fecal material.  11/29/21 Progressing  Current:  Pt reports  FI but not every day - depending upon what she eats. 12/9/21     Patient will demonstrate pelvic floor muscle contraction 2/5 and ability to maintain contraction for 5 seconds, 10 repetitions.    Status at eval: PFM 1/5, 1-2 second hold, 3 repetitions, with glut and breath holding substitution   Current:  11/29/21 PF 2/4/4 with glut substitution    Patient will demonstrate independence with management tools and exercise program that are beneficial for current condition in order to feel comfortable with Pelvic floor PT D/C and not fear exacerbation of current condition or symptoms returning.    Status at eval : patient fearful of return to exercise and unaware of what activities to avoid to avoid exacerbation of current condition  Current:  11/29/21 Patient is progressing using management tools and exercise.       PLAN  []  Upgrade activities as tolerated     [x]  Continue plan of care  [x]  Update interventions per flow sheet       []  Discharge due to:_  []  Other:_  GOAL: PF STRENGTH AND AWARENESS  plan: PF overflow therex and hips elevated; core progressions,  stool consistency and toileting posture and straining review/follow up (need type 4) ; reassess PF prior to biofeedback: biofeedback, provide maintenance and long term HEP    Ebony Moss PT 12/14/2021  10:17 AM    Future Appointments   Date Time Provider Jean-Pierre Schulz   12/21/2021 10:15 AM Bob Mena PT [facility]   12/28/2021 10:15 AM Bob Mena, PT [facility]

## 2021-12-21 ENCOUNTER — HOSPITAL ENCOUNTER (OUTPATIENT)
Dept: PHYSICAL THERAPY | Age: 76
Discharge: HOME | End: 2021-12-21
Payer: COMMERCIAL

## 2021-12-21 PROCEDURE — 97535 SELF CARE MNGMENT TRAINING: CPT

## 2021-12-21 PROCEDURE — 97112 NEUROMUSCULAR REEDUCATION: CPT

## 2021-12-21 NOTE — PROGRESS NOTES
PT DAILY TREATMENT NOTE    Patient Name: Toni Fuller  Date:2021  : 1945  [x]  Patient  Verified  Payor: BLUE CROSS / Plan: [employer] [facility] / Product Type: PPO /    In time:1019  Out time:  1115  Total Treatment Time (min): 56  Total Timed Codes (min): 56  1:1 Treatment Time ( [address] only): 56  Visit #: 7 of 10    Treatment Area: Other symptoms and signs involving the genitourinary system [R39.89]    SUBJECTIVE  Pain Level (0-10 scale): 0/10  Any medication changes, allergies to medications, adverse drug reactions, diagnosis change, or new procedure performed?: [x] No    [] Yes (see summary sheet for update)  Subjective functional status/changes:   [] No changes reported  Patient reports: good  compliance with current HEP. OBJECTIVE      15 min Self Care: Thorough review of her goals and progress towards discharge   Rationale:  Increase awareness and understanding of current condition to improve patients ability to independently and effectively attain goals and progress towards long term management of current condition. 41 min Neuromuscular Re-education:  [x]  See flow sheet : surface EMG and explanation of the results   Rationale: Increase pelvic floor muscle strength, Improve quality of pelvic floor contractions, Decrease resting tone of the pelvic floor, Increase core strength, Inhibit abnormal muscle activity and Improve lumbosacral and coccygeal mobility in order to Increase fecal continence and Improve ability to perform ADLs.                   With   [] TE   [] TA   [] neuro   [] other: Patient Education: [x] Review HEP    [] Progressed/Changed HEP based on:   [] positioning   [] body mechanics   [] transfers   [] heat/ice application    [] other:      Other Objective/Functional Measures: Biofeedback expectations and implications were reviewed with patient prior to intervention,   Performed sEMG with perianal electrodes as follows:    Position, initial resting tone Work/Rest/Reps Comments   Supine   5/10/10  2/4/10   Resting at 6.9  uV    Work at   18.7    uV   Sitting   5/10/10  2/4/10   Resting at 6.3 uV     Work at 15.8 uV     Patient was able to allow her PF to rest as low as  1.8 uV but she demonstrated delayed decrease in pelvic floor activity resulting in a higher rest average. The max contraction in sup/semireclined = 110 uV and 41 uV in sitting. Neuromuscular Re-education was provided with visual, verbal and tactile cueing throughout intervention  Results and implications for treatment and HEP were reviewed in detail with patient during and following today's intervention. Pain Level (0-10 scale) post treatment: 0/10    ASSESSMENT/Changes in Function: Patient tolerated today's session well with no c/o pain. Patient demonstrated understanding of today's instruction, education, and recommendations. Patient is progressing appropriately towards goals. Patient tolerated surface EMG in semi-recumbant and sitting positions. Patient was able to allow her PF to rest as low as  1.8 uV but she demonstrated delayed decrease in pelvic floor activity resulting in a higher rest average. The max contraction in sup/semireclined = 110 uV and 41 uV in sitting. Patient will continue to benefit from skilled PT services to modify and progress therapeutic interventions, address functional mobility deficits, address ROM deficits, address strength deficits, analyze and address soft tissue restrictions, analyze and cue movement patterns, analyze and modify body mechanics/ergonomics, assess and modify postural abnormalities and instruct in home and community integration to attain remaining goals. [x]  See Plan of Care  []  See progress note/recertification  []  See Discharge Summary         Progress towards goals / Updated goals:  Short term goals:  To be achieved in 4 weeks:  Patient will demonstrate proper fluid and fiber intake to aid in bowel movement consistency and frequency  Satus at eval: consuming 32-48 oz water and type 1-2 stool with straining, standard toileting posture and occasionally pressing perineum for evacuation  Current: Patient reports reduced straining and her bowel movements are now 5 on the [place] scale.  (11/9/21)  Progressing   Current:  Patient reports reduced straining by her bowel movements are 1-2 on the [place] scale (11/16/21) Progressing  But stool quality has regressed  Current :  11/29/21  Patient reports reduced straining by her bowel movements are 1-2 on the [place]  Current:  12/9/21 Patient reports no straining and that her bowel movements are a 5 on the [place]aph[place]al scale Progressing  12/14/2021 pt reports stool is type 2, hard but no straining; reviewed and implemented soluble fiber recommendations and toileting posture     Patient will be able to maintain pelvic floor contraction for 5 seconds, 5 repetitions with no accessory substitution or breath holding. Status at eval: PFMC 1 seconds, 3 repetitions with glut substitutions and breath holding  Current: [facility] 2/4/4 with glut substitution        Long term goals: To be achieved in 10 weeks:  Patient will demonstrate improvement in fecal incontinence episodes evidenced by a 75% decrease in fecal smearing per month  Status at eval: Pt reports FI episodes \"weekly\" finding small amount in underwear, on floor or in shower  Current:  Pt reports no bowel movement on the floor of the shower.  (11/16/21)  Current:  Pt reports occasional FI but less often and she reports feeling that there is fecal material.  11/29/21 Progressing  Current:  Pt reports  FI but not every day - depending upon what she eats.  12/9/21     Patient will demonstrate pelvic floor muscle contraction 2/5 and ability to maintain contraction for 5 seconds, 10 repetitions.    Status at eval: PFM 1/5, 1-2 second hold, 3 repetitions, with glut and breath holding substitution   Current:  11/29/21 PF 2/4/4 with glut substitution     Patient will demonstrate independence with management tools and exercise program that are beneficial for current condition in order to feel comfortable with Pelvic floor PT D/C and not fear exacerbation of current condition or symptoms returning. Status at al : patient fearful of return to exercise and unaware of what activities to avoid to avoid exacerbation of current condition  Current:  11/29/21 Patient is progressing using management tools and exercise. Current: 12/21/21 Patient is progressing with management tools and exercise.   Discussed discharge with patient.       PLAN  []  Upgrade activities as tolerated     [x]  Continue plan of care  [x]  Update interventions per flow sheet       []  Discharge due to:_  []  Other:_  GOAL: PF STRENGTH AND AWARENESS  plan: PF overflow therex and hips elevated; core progressions,  stool consistency and toileting posture and straining review/follow up (need type 4) ; reassess PF prior to biofeedback: biofeedback, provide maintenance and long term HEP    Alonzo Strauss, PT 12/21/2021  8:58 AM    Future Appointments   Date Time Provider Jean-Pierre Schulz   12/21/2021 10:15 AM Deangelo Aragon, [facility]   12/28/2021 10:15 AM Deangelo Aragon, [facility]   1/4/2022 10:15 AM Sheyla Zapata [facility]   1/11/2022 10:15 AM Deangelo Aragon, PT [facility]   1/18/2022 10:15 AM Deangelo Aragon, [facility]   1/25/2022 10:15 AM Deangelo Aragon, [address] [place] [address]

## 2021-12-28 ENCOUNTER — HOSPITAL ENCOUNTER (OUTPATIENT)
Dept: PHYSICAL THERAPY | Age: 76
Discharge: HOME | End: 2021-12-28
Payer: COMMERCIAL

## 2021-12-28 PROCEDURE — 97530 THERAPEUTIC ACTIVITIES: CPT

## 2021-12-28 PROCEDURE — 97110 THERAPEUTIC EXERCISES: CPT

## 2021-12-28 NOTE — PROGRESS NOTES
PT DAILY TREATMENT NOTE    Patient Name: Belkys Manrique  Date:2021  : 1945  [x]  Patient  Verified  Payor: BLUE CROSS / Plan:  [facility] / Product Type: PPO /    In time:1015  Out time:1100  Total Treatment Time (min): 45  Total Timed Codes (min): 45  1:1 Treatment Time ([facility] only): 45   Visit #: 7 of 10    Treatment Area: Other symptoms and signs involving the genitourinary system [R39.89]    SUBJECTIVE  Pain Level (0-10 scale): 0/10  Any medication changes, allergies to medications, adverse drug reactions, diagnosis change, or new procedure performed?: [x] No    [] Yes (see summary sheet for update)  Subjective functional status/changes:   [] No changes reported  Patient reports: decreased compliance with current HEP due to the [place] holiday. OBJECTIVE    25 min Therapeutic Exercise:  [x] See flow sheet :   Rationale: Increase pelvic floor muscle strength, Improve quality of pelvic floor contractions, Increase core strength, Inhibit abnormal muscle activity and Improve lumbosacral and coccygeal mobility in order to Increase fecal continence and Improve ability to perform ADLs. 20 min Therapeutic Activity:  [x]  See flow sheet :  Reassess, functional carries   Rationale: Increase pelvic floor muscle strength, Improve quality of pelvic floor contractions, Increase core strength, Inhibit abnormal muscle activity and Improve lumbosacral and coccygeal mobility in order to Increase fecal continence and Improve ability to perform ADLs. With   [] TE   [] TA   [] neuro   [] other: Patient Education: [x] Review HEP    [] Progressed/Changed HEP based on:   [] positioning   [] body mechanics   [] transfers   [] heat/ice application    [] other:           Pain Level (0-10 scale) post treatment: 0/10    ASSESSMENT/Changes in Function: Patient tolerated today's session well with no c/o pain.  Patient demonstrated understanding of today's instruction, education, and recommendations. Patient is progressing appropriately towards goals. Patient tolerated functional carries and increased difficulty of core stabilization exercises. Patient will continue to benefit from skilled PT services to modify and progress therapeutic interventions, address functional mobility deficits, address ROM deficits, address strength deficits, analyze and address soft tissue restrictions, analyze and cue movement patterns, analyze and modify body mechanics/ergonomics and instruct in home and community integration to attain remaining goals. [x]  See Plan of Care  []  See progress note/recertification  []  See Discharge Summary         Progress towards goals / Updated goals:  Short term goals: To be achieved in 4 weeks:  Patient will demonstrate proper fluid and fiber intake to aid in bowel movement consistency and frequency  Satus at eval: consuming 32-48 oz water and type 1-2 stool with straining, standard toileting posture and occasionally pressing perineum for evacuation  Current: Patient reports reduced straining and her bowel movements are now 5 on the [place] scale.  (11/9/21)  Progressing   Current:  Patient reports reduced straining by her bowel movements are 1-2 on the [place] scale (11/16/21) Progressing  But stool quality has regressed  Current :  11/29/21  Patient reports reduced straining by her bowel movements are 1-2 on the [place]  Current:  12/9/21 Patient reports no straining and that her bowel movements are a 5 on the [facility] scale Progressing  12/14/2021 pt reports stool is type 2, hard but no straining; reviewed and implemented soluble fiber recommendations and toileting posture  12/28/21 patient reports small stool that are soft and not difficult to pass.       Patient will be able to maintain pelvic floor contraction for 5 seconds, 5 repetitions with no accessory substitution or breath holding.   Status at eval: PF 1 seconds, 3 repetitions with glut substitutions and breath holding  Current: [facility] 2/4/4 with glut substitution  Current:  Deferred to next appointment  (12/28/21)        Long term goals: To be achieved in 10 weeks:  Patient will demonstrate improvement in fecal incontinence episodes evidenced by a 75% decrease in fecal smearing per month  Status at eval: Pt reports FI episodes \"weekly\" finding small amount in underwear, on floor or in shower  Current:  Pt reports no bowel movement on the floor of the shower.  (11/16/21)  Current:  Pt reports occasional FI but less often and she reports feeling that there is fecal material.  11/29/21 Progressing  Current:  Pt reports  FI but not every day - depending upon what she eats.  12/9/21  Current:  Patient reports no FI on floor of shower and not every day. 12/28/21     Patient will demonstrate pelvic floor muscle contraction 2/5 and ability to maintain contraction for 5 seconds, 10 repetitions. Status at eval: PFM 1/5, 1-2 second hold, 3 repetitions, with glut and breath holding substitution   Current:  11/29/21 PF 2/4/4 with glut substitution  Current:  Deferred to next appointment  (12/28/21)        Patient will demonstrate independence with management tools and exercise program that are beneficial for current condition in order to feel comfortable with Pelvic floor PT D/C and not fear exacerbation of current condition or symptoms returning. Status at eval : patient fearful of return to exercise and unaware of what activities to avoid to avoid exacerbation of current condition  Current:  11/29/21 Patient is progressing using management tools and exercise. Current: 12/21/21 Patient is progressing with management tools and exercise. Discussed discharge with patient.   Current:  Patient continues to use the management tools and HEP (although decreased consistency due to the holidays)  12/28/21          PLAN  []  Upgrade activities as tolerated     [x]  Continue plan of care  [x]  Update interventions per flow sheet       []  Discharge due to:_  []  Other:_  GOAL: PF STRENGTH AND AWARENESS  plan: PF overflow therex and hips elevated; core progressions,  stool consistency and toileting posture and straining review/follow up (need type 4) ; reassess PF prior to biofeedback: biofeedback, provide maintenance and long term HEP    Neelam De Dios PT 12/28/2021  10:18 AM    Future Appointments   Date Time Provider Jean-Pierre Schulz   1/4/2022 10:15 AM Arleth Watson, PT [facility]   1/11/2022 10:15 AM Hao Lux, PT [facility]   1/18/2022 10:15 AM Hoa Lux, PT [facility]   1/25/2022 10:15 AM Hoa Lux, [facility]

## 2021-12-28 NOTE — PROGRESS NOTES
[facility] [address] [facility]  [facility], [address]  Ph [phone]  Fx [phone]    Physical Therapy Progress Note  Patient name: Leonila Ayoub Start of Care: 21   Referral source: Jairon Bryan MD : 1945   Medical/Treatment Diagnosis: Other symptoms and signs involving the genitourinary system [R39.89] Onset Date: 2+ years ago   Prior Hospitalization: see medical history Provider#: [id number]   Medications: Verified on Patient Summary List     Comorbidities: PMHx/Surgical Hx: 4 pregnancies/3 vaginal deliveries; MEHREEN , RA, HBP, stroke, inguinal hernia repair; appendectomy   Prior Level of Function: PLOF: no functional limitation, no issues with ADLs, active lifestyle      Visits from Start of Care: 8    Missed Visits: 0    Updated Goals/Measure of Progress: To be achieved in 6 weeks:  Short term goals:  To be achieved in 2 weeks:  Patient will demonstrate proper fluid and fiber intake to aid in bowel movement consistency and frequency  Satus at eval: consuming 32-48 oz water and type 1-2 stool with straining, standard toileting posture and occasionally pressing perineum for evacuation  Current: Patient reports reduced straining and her bowel movements are now 5 on the [place] scale.  (21)  Progressing   Current:  Patient reports reduced straining by her bowel movements are 1-2 on the [place] scale (21) Progressing  But stool quality has regressed  Current :  21  Patient reports reduced straining by her bowel movements are 1-2 on the Jus  Current:  21 Patient reports no straining and that her bowel movements are a 5 on the [facility] scale Progressing  2021 pt reports stool is type 2, hard but no straining; reviewed and implemented soluble fiber recommendations and toileting posture  21 patient reports small stool that are soft and not difficult to pass.       Patient will be able to maintain pelvic floor contraction for 5 seconds, 5 repetitions with no accessory substitution or breath holding. Status at eval: PFMC 1 seconds, 3 repetitions with glut substitutions and breath holding  Current: [facility] 2/4/4 with glut substitution  Current:  Deferred to next appointment  (12/28/21)        Long term goals: To be achieved in 6 weeks:  Patient will demonstrate improvement in fecal incontinence episodes evidenced by a 75% decrease in fecal smearing per month  Status at eval: Pt reports FI episodes \"weekly\" finding small amount in underwear, on floor or in shower  Current:  Pt reports no bowel movement on the floor of the shower.  (11/16/21)  Current:  Pt reports occasional FI but less often and she reports feeling that there is fecal material.  11/29/21 Progressing  Current:  Pt reports  FI but not every day - depending upon what she eats.  12/9/21  Current:  Patient reports no FI on floor of shower and not every day. 12/28/21     Patient will demonstrate pelvic floor muscle contraction 2/5 and ability to maintain contraction for 5 seconds, 10 repetitions. Status at eval: PFM 1/5, 1-2 second hold, 3 repetitions, with glut and breath holding substitution   Current:  11/29/21 PF 2/4/4 with glut substitution  Current:  Deferred to next appointment  (12/28/21)        Patient will demonstrate independence with management tools and exercise program that are beneficial for current condition in order to feel comfortable with Pelvic floor PT D/C and not fear exacerbation of current condition or symptoms returning. Status at eval : patient fearful of return to exercise and unaware of what activities to avoid to avoid exacerbation of current condition  Current:  11/29/21 Patient is progressing using management tools and exercise. Current: 12/21/21 Patient is progressing with management tools and exercise.  Discussed discharge with patient.   Current:  Patient continues to use the management tools and HEP (although decreased consistency due to the holidays)  12/28/21       Summary of Care/ Key Functional Changes: Patient is a 63 y/o female with pelvic organ prolapse and fecal incontinence. She has attended 8 pelvic floor physical therapy sessions. Patient reports  a decrease in FI episodes and increased perianal awareness. She reports no longer straining to have a bowel movement.    She is progressing towards all goals and will benefit from continued pelvic floor physical therapy      ASSESSMENT/RECOMMENDATIONS:  [x]Continue therapy per initial plan/protocol at a frequency of  1 x per week for 6 weeks  []Continue therapy with the following recommended changes:_____________________ _____________________________ ________________________________________  []Discontinue therapy progressing towards or have reached established goals  []Discontinue therapy due to lack of appreciable progress towards goals  []Discontinue therapy due to lack of attendance or compliance  []Await Physician's recommendations/decisions regarding therapy  []Other:________________________________________________________________    Thank you for this referral.   Harvinder Downey PT 12/28/2021 11:21 AM

## 2022-01-04 ENCOUNTER — HOSPITAL ENCOUNTER (OUTPATIENT)
Dept: PHYSICAL THERAPY | Age: 77
Discharge: HOME | End: 2022-01-04
Payer: COMMERCIAL

## 2022-01-04 PROCEDURE — 97112 NEUROMUSCULAR REEDUCATION: CPT

## 2022-01-04 PROCEDURE — 97535 SELF CARE MNGMENT TRAINING: CPT

## 2022-01-04 NOTE — PROGRESS NOTES
Physical Therapy Discharge Instructions    [facility]  [address] [place], [address] [facility]  Ph [phone]  Fx [phone]      Patient: Elbert Johnston  : 1945      Continue Home Exercise Program daily as recommended for long term independent management of current condition until comfortable initiating \"maintenance phase\" as discussed at DC; follow \"maintenance\" plan as discussed with general recommendation of continuing PF HEP once/day, most days/week and additional exercise 3x/week. Continue to follow dietary and behavioral recommendations indefinitely.      Continue with    [] Ice  as needed  times per day     [] Heat           Follow up with MD:     [x] Upon completion of therapy     [] As needed      Recommendations:     [x]   Return to activity with home program    []   Return to activity with the following modifications:       []Post Rehab Program    []Join Independent aquatic program     []Return to/join local gym        Additional Comments: see updated HEP h/o          Florence Hairston, PT 2022 10:57 AM

## 2022-01-04 NOTE — PROGRESS NOTES
[facility]  [address], [address]  Ph [phone]  Fx [phone]    Physical Therapy Discharge Summary    Patient name: Liya Garcia Start of Care: 21   Referral source: Reid Gayle MD : 1945   Medical/Treatment Diagnosis: Other symptoms and signs involving the genitourinary system [R39.89] Onset Date: 2+ years ago   Prior Hospitalization: see medical history Provider#: [id number]   Medications: Verified on Patient Summary List     Comorbidities: PMHx/Surgical Hx: 4 pregnancies/3 vaginal deliveries; MEHREEN , RA, HBP, stroke, inguinal hernia repair; appendectomy   Prior Level of Function: PLOF: no functional limitation, no issues with ADLs, active lifestyle        Visits from Start of Care: 8                                              Missed Visits: 0        Reporting Period : 2021 to 2022    Goals/Measure of Progress:  Short term goals:  To be achieved in 2 weeks:  Patient will demonstrate proper fluid and fiber intake to aid in bowel movement consistency and frequency  Satus at eval: consuming 32-48 oz water and type 1-2 stool with straining, standard toileting posture and occasionally pressing perineum for evacuation  Current: Patient reports reduced straining and her bowel movements are now 5 on the [place] scale.  (21)  Progressing   Current:  Patient reports reduced straining by her bowel movements are 1-2 on the [place] scale (21) Progressing  But stool quality has regressed  Current :  21  Patient reports reduced straining by her bowel movements are 1-2 on the Jus  Current:  21 Patient reports no straining and that her bowel movements are a 5 on the [facility] scale Progressing  2021 pt reports stool is type 2, hard but no straining; reviewed and implemented soluble fiber recommendations and toileting posture  21 patient reports stool consistency soft, formed, easy to evacuate, no straining and has implemented fiber intake and squatty potty position; GOAL MET     Patient will be able to maintain pelvic floor contraction for 5 seconds, 5 repetitions with no accessory substitution or breath holding. Status at eval: PFMC 1 seconds, 3 repetitions with glut substitutions and breath holding  Current: [facility] 2/4/4 with glut substitution  1/4/2022 Pt performing kegels 2-3 times/day long and short holds 10 seconds for long holds, mostly able to hold the entire time; PROGRESSING INDEPENDENTLY         Long term goals: To be achieved in 6 weeks:  Patient will demonstrate improvement in fecal incontinence episodes evidenced by a 75% decrease in fecal smearing per month  Status at eval: Pt reports FI episodes \"weekly\" finding small amount in underwear, on floor or in shower  Current:  Pt reports no bowel movement on the floor of the shower.  (11/16/21)  Current:  Pt reports occasional FI but less often and she reports feeling that there is fecal material.  11/29/21 Progressing  1/4/2022 Pt reports improvement in FI since IE, hard to report level of improvement due to infrequency of FI, reports \"not a problem\" and thinks that it is generally standing after BM that \" a small amount falls out but it really isn't a problem\" GOAL MET     Patient will demonstrate pelvic floor muscle contraction 2/5 and ability to maintain contraction for 5 seconds, 10 repetitions.    Status at eval: PFM 1/5, 1-2 second hold, 3 repetitions, with glut and breath holding substitution   Current:  11/29/21 PF 2/4/4 with glut substitution  1/4/2022 PERF 2/10/5//3; poor coordination with quicks; reviewed today, significantly improved motor recruitment and endurance since IE; GOAL MOSTLY MET, progressing independently         Patient will demonstrate independence with management tools and exercise program that are beneficial for current condition in order to feel comfortable with Pelvic floor PT D/C and not fear exacerbation of current condition or symptoms returning. Status at eval : patient fearful of return to exercise and unaware of what activities to avoid to avoid exacerbation of current condition  Current:  11/29/21 Patient is progressing using management tools and exercise. Current: 12/21/21 Patient is progressing with management tools and exercise.  Discussed discharge with patient. Current:  Patient continues to use the management tools and HEP (although decreased consistency due to the holidays)  12/28/21 1/4/2022 Pt comfortable and compliant with current HEP and maintenance/independence recommendations and DC today; GOAL MET     Assessment/ Summary of Care: Pt has made excellent progress towards goals and has demonstrated improvement with BM and tolerance to activity. Pt reports considerable improvement with FI and and improved strength of core and PF mm. Pt reports independence with HEP without any issues. Pt reports readiness to D/C from therapy and transition to independent HEP and continuation with recommendations/management tools at home. Pt is D/C from therapy at this time.      RECOMMENDATIONS:  [x]Discontinue therapy: [x]Patient has reached or is progressing toward set goals      []Patient is non-compliant or has abdicated      []Due to lack of appreciable progress towards set goals    Zacarias Mendiola, PT 1/4/2022 10:58 AM

## 2022-01-04 NOTE — PROGRESS NOTES
PT DAILY TREATMENT NOTE    Patient Name: Alka Finnegan  Date:2022  : 1945  [x]  Patient  Verified  Payor: BLUE CROSS / Plan:  [facility] / Product Type: PPO /    In time:1015  Out time:1055  Total Treatment Time (min): 40  Total Timed Codes (min): 40  1:1 Treatment Time (MC only): 40   Visit #: 9 of 10    Treatment Area: Other symptoms and signs involving the genitourinary system [R39.89]    SUBJECTIVE  Pain Level (0-10 scale): 0/10  Any medication changes, allergies to medications, adverse drug reactions, diagnosis change, or new procedure performed?: [x] No    [] Yes (see summary sheet for update)  Subjective functional status/changes:   [] No changes reported  Patient reports: compliance with current HEP. Patient states she feels like she is much stronger and is prepared for DC at this time. OBJECTIVE    25 min Self Care: Thorough review and handout provided on the following: current HEP review/update and provided  Reviewed maintenance plan and recommendations for long term independent management  Answered all pt questions   Rationale:  Increase awareness and understanding of current condition to improve patients ability to independently and effectively attain goals and progress towards long term management of current condition. 15 min Neuromuscular Re-education:  [x]  See flow sheet : via internal digital facilitation and feedback for long and short holds  Poor coordination quicks with exhale initially, improved with reps and cue   Rationale: Increase pelvic floor muscle strength, Improve quality of pelvic floor contractions and Inhibit abnormal muscle activity in order to Improve ability to perform ADLs.           With   [] TE   [] TA   [] neuro   [] other: Patient Education: [x] Review HEP    [] Progressed/Changed HEP based on:   [] positioning   [] body mechanics   [] transfers   [] heat/ice application    [] other:      Other Objective/Functional Measures:  PERF 2/10/5//3; poor coordination with quicks; reviewed today, significantly improved motor recruitment and endurance since IE; GOAL MOSTLY MET, progressing independently     Pain Level (0-10 scale) post treatment: 0/10    ASSESSMENT/Changes in Function: Patient tolerated today's session well with no c/o pain. Patient demonstrated understanding of today's instruction, education, and recommendations for long term management of current condition. Patient has achieved goals and is ready for DC and independence with updated HEP and progressions. []  See Plan of Care  []  See progress note/recertification  [x]  See Discharge Summary         Progress towards goals / Updated goals:  Short term goals: To be achieved in 2 weeks:  Patient will demonstrate proper fluid and fiber intake to aid in bowel movement consistency and frequency  Satus at eval: consuming 32-48 oz water and type 1-2 stool with straining, standard toileting posture and occasionally pressing perineum for evacuation  Current: Patient reports reduced straining and her bowel movements are now 5 on the [place] scale.  (11/9/21)  Progressing   Current:  Patient reports reduced straining by her bowel movements are 1-2 on the [place] scale (11/16/21) Progressing  But stool quality has regressed  Current :  11/29/21  Patient reports reduced straining by her bowel movements are 1-2 on the [place]  Current:  12/9/21 Patient reports no straining and that her bowel movements are a 5 on the [facility] scale Progressing  12/14/2021 pt reports stool is type 2, hard but no straining; reviewed and implemented soluble fiber recommendations and toileting posture  12/28/21 patient reports stool consistency soft, formed, easy to evacuate, no straining and has implemented fiber intake and squatty potty position; GOAL MET     Patient will be able to maintain pelvic floor contraction for 5 seconds, 5 repetitions with no accessory substitution or breath holding.   Status at eval: PFMC 1 seconds, 3 repetitions with glut substitutions and breath holding  Current: [address] 2/4/4 with glut substitution  1/4/2022 Pt performing kegels 2-3 times/day long and short holds 10 seconds for long holds, mostly able to hold the entire time; PROGRESSING INDEPENDENTLY         Long term goals: To be achieved in 6 weeks:  Patient will demonstrate improvement in fecal incontinence episodes evidenced by a 75% decrease in fecal smearing per month  Status at eval: Pt reports FI episodes \"weekly\" finding small amount in underwear, on floor or in shower  Current:  Pt reports no bowel movement on the floor of the shower.  (11/16/21)  Current:  Pt reports occasional FI but less often and she reports feeling that there is fecal material.  11/29/21 Progressing  1/4/2022 Pt reports improvement in FI since IE, hard to report level of improvement due to infrequency of FI, reports \"not a problem\" and thinks that it is generally standing after BM that \" a small amount falls out but it really isn't a problem\" GOAL MET     Patient will demonstrate pelvic floor muscle contraction 2/5 and ability to maintain contraction for 5 seconds, 10 repetitions. Status at eval: PFM 1/5, 1-2 second hold, 3 repetitions, with glut and breath holding substitution   Current:  11/29/21 PF 2/4/4 with glut substitution  1/4/2022 PERF 2/10/5//3; poor coordination with quicks; reviewed today, significantly improved motor recruitment and endurance since IE; GOAL MOSTLY MET, progressing independently         Patient will demonstrate independence with management tools and exercise program that are beneficial for current condition in order to feel comfortable with Pelvic floor PT D/C and not fear exacerbation of current condition or symptoms returning.    Status at eval : patient fearful of return to exercise and unaware of what activities to avoid to avoid exacerbation of current condition  Current:  11/29/21 Patient is progressing using management tools and exercise. Current: 12/21/21 Patient is progressing with management tools and exercise.  Discussed discharge with patient. Current:  Patient continues to use the management tools and HEP (although decreased consistency due to the holidays)  12/28/21 1/4/2022 Pt comfortable and compliant with current HEP and maintenance/independence recommendations and DC today; GOAL MET    PLAN  []  Upgrade activities as tolerated     []  Continue plan of care  []  Update interventions per flow sheet       [x]  Discharge due to:_Patient has met all goals and is independent and confident with exercise progressions and management tools for long term management of current condition.    []  Other:_      John Arboleda, PT 1/4/2022  10:18 AM    Future Appointments   Date Time Provider Jean-Pierre Schulz   1/11/2022 10:15 AM Lindi Goltz, PT [facility]   1/18/2022 10:15 AM Lindi Goltz, PT [facility]   1/25/2022 10:15 AM Lindi Goltz, PT [facility]

## 2022-01-11 ENCOUNTER — APPOINTMENT (OUTPATIENT)
Dept: PHYSICAL THERAPY | Age: 77
End: 2022-01-11
Payer: COMMERCIAL

## 2022-01-18 ENCOUNTER — APPOINTMENT (OUTPATIENT)
Dept: PHYSICAL THERAPY | Age: 77
End: 2022-01-18
Payer: COMMERCIAL

## 2022-01-25 ENCOUNTER — APPOINTMENT (OUTPATIENT)
Dept: PHYSICAL THERAPY | Age: 77
End: 2022-01-25
Payer: COMMERCIAL